# Patient Record
Sex: FEMALE | Race: WHITE | NOT HISPANIC OR LATINO | Employment: OTHER | URBAN - METROPOLITAN AREA
[De-identification: names, ages, dates, MRNs, and addresses within clinical notes are randomized per-mention and may not be internally consistent; named-entity substitution may affect disease eponyms.]

---

## 2016-11-28 LAB
ALT SERPL-CCNC: 35 U/L (ref 6–29)
AST SERPL-CCNC: 22 U/L (ref 10–35)
CREAT SERPL-MCNC: 0.72 MG/DL (ref 0.6–0.93)
GFR SERPL CREATININE-BSD FRML MDRD: 84 ML/MIN/1.73M2
GLUCOSE SERPL-MCNC: 125 MG/DL (ref 65–99)
POTASSIUM SERPL-SCNC: 5 MMOL/L (ref 3.5–5.3)

## 2017-03-30 ENCOUNTER — TRANSFERRED RECORDS (OUTPATIENT)
Dept: HEALTH INFORMATION MANAGEMENT | Facility: CLINIC | Age: 72
End: 2017-03-30

## 2017-03-30 LAB — TSH SERPL-ACNC: 1.82 MIU/L (ref 0.4–4.5)

## 2017-10-12 ENCOUNTER — TRANSFERRED RECORDS (OUTPATIENT)
Dept: HEALTH INFORMATION MANAGEMENT | Facility: CLINIC | Age: 72
End: 2017-10-12

## 2018-10-16 ENCOUNTER — OFFICE VISIT (OUTPATIENT)
Dept: OTOLARYNGOLOGY | Facility: CLINIC | Age: 73
End: 2018-10-16
Payer: MEDICARE

## 2018-10-16 ENCOUNTER — OFFICE VISIT (OUTPATIENT)
Dept: AUDIOLOGY | Facility: CLINIC | Age: 73
End: 2018-10-16
Payer: MEDICARE

## 2018-10-16 VITALS
SYSTOLIC BLOOD PRESSURE: 165 MMHG | DIASTOLIC BLOOD PRESSURE: 89 MMHG | WEIGHT: 164 LBS | BODY MASS INDEX: 28 KG/M2 | HEIGHT: 64 IN | HEART RATE: 75 BPM

## 2018-10-16 DIAGNOSIS — R42 VERTIGO: Primary | ICD-10-CM

## 2018-10-16 DIAGNOSIS — H90.3 SENSORINEURAL HEARING LOSS (SNHL) OF BOTH EARS: Primary | ICD-10-CM

## 2018-10-16 PROCEDURE — 92557 COMPREHENSIVE HEARING TEST: CPT | Performed by: AUDIOLOGIST

## 2018-10-16 PROCEDURE — 99203 OFFICE O/P NEW LOW 30 MIN: CPT | Performed by: OTOLARYNGOLOGY

## 2018-10-16 PROCEDURE — 92550 TYMPANOMETRY & REFLEX THRESH: CPT | Performed by: AUDIOLOGIST

## 2018-10-16 RX ORDER — ASPIRIN 81 MG/1
TABLET ORAL
COMMUNITY
Start: 2006-11-17

## 2018-10-16 RX ORDER — BUSPIRONE HYDROCHLORIDE 5 MG/1
5 TABLET ORAL
COMMUNITY
Start: 2018-07-09

## 2018-10-16 RX ORDER — FEXOFENADINE HCL AND PSEUDOEPHEDRINE HCL 180; 240 MG/1; MG/1
1 TABLET, EXTENDED RELEASE ORAL
COMMUNITY
Start: 2015-06-16

## 2018-10-16 RX ORDER — HYDROXYCHLOROQUINE SULFATE 200 MG/1
200 TABLET, FILM COATED ORAL
COMMUNITY
Start: 2018-09-05

## 2018-10-16 RX ORDER — LANCETS
EACH MISCELLANEOUS
COMMUNITY
Start: 2018-06-05

## 2018-10-16 RX ORDER — LISINOPRIL 40 MG/1
40 TABLET ORAL
COMMUNITY
Start: 2018-07-09

## 2018-10-16 RX ORDER — METFORMIN HCL 500 MG
2000 TABLET, EXTENDED RELEASE 24 HR ORAL
COMMUNITY
Start: 2018-07-09

## 2018-10-16 RX ORDER — ESCITALOPRAM OXALATE 20 MG/1
20 TABLET ORAL
COMMUNITY
Start: 2018-07-09

## 2018-10-16 RX ORDER — SIMVASTATIN 10 MG
10 TABLET ORAL AT BEDTIME
COMMUNITY

## 2018-10-16 RX ORDER — ALBUTEROL SULFATE 90 UG/1
2 AEROSOL, METERED RESPIRATORY (INHALATION)
COMMUNITY
Start: 2018-02-23

## 2018-10-16 RX ORDER — LEUCOVORIN CALCIUM 5 MG/1
TABLET ORAL
COMMUNITY
Start: 2018-09-05

## 2018-10-16 ASSESSMENT — ENCOUNTER SYMPTOMS
HEARTBURN: 0
DOUBLE VISION: 1
BLURRED VISION: 0
TINGLING: 0
BRUISES/BLEEDS EASILY: 0
DIZZINESS: 1
SORE THROAT: 0
PHOTOPHOBIA: 0
HEADACHES: 1
CONSTITUTIONAL NEGATIVE: 1
POLYDIPSIA: 0
SINUS PAIN: 0
STRIDOR: 0
NAUSEA: 0
VOMITING: 0
TREMORS: 1

## 2018-10-16 NOTE — PROGRESS NOTES
AUDIOLOGY REPORT    SUMMARY: Audiology visit completed. See audiogram for results.    RECOMMENDATIONS: Follow-up with ENT.    Cyrus Kim  Doctor of Audiology  [place] License # [id number]

## 2018-10-16 NOTE — MR AVS SNAPSHOT
After Visit Summary   10/16/2018    Cammy Stark    MRN: [id number]           Patient Information     Date Of Birth          1945        Visit Information        Provider Department      10/16/2018 9:30 AM Nathaniel Espinoza MD [facility]        Today's Diagnoses     Vertigo    -  1       Follow-ups after your visit        Additional Services     AUDIOLOGY ADULT REFERRAL       Your provider has referred you to: [facility]: [facility] [phone]   [email or web address]    Treatment:  Evaluation & Treatment  Specialty Testing:  Audiogram w/Tymps and Reflexes    Please be aware that coverage of these services is subject to the terms and limitations of your health insurance plan.  Call member services at your health plan with any benefit or coverage questions.      Please bring the following to your appointment:  >>   Any x-rays, CTs or MRIs which have been performed.  Contact the facility where they were done to arrange for  prior to your scheduled appointment.   >>   List of current medications  >>   This referral request   >>   Any documents/labs given to you for this referral            PHYSICAL THERAPY REFERRAL       If you have not heard from the scheduling office within 2 business days, please call [phone] for all locations, with the exception of [place], please call [phone] and [place], please call [phone].    Please be aware that coverage of these services is subject to the terms and limitations of your health insurance plan.  Call member services at your health plan with any benefit or coverage questions.                  Future tests that were ordered for you today     Open Future Orders        Priority Expected Expires Ordered    PHYSICAL THERAPY REFERRAL Routine  10/16/2019 10/16/2018            Who to contact     If you have questions or need follow up information  "about today's clinic visit or your schedule please contact [facility] directly at [phone].  Normal or non-critical lab and imaging results will be communicated to you by [employer]hart, letter or phone within 4 business days after the clinic has received the results. If you do not hear from us within 7 days, please contact the clinic through MyChart or phone. If you have a critical or abnormal lab result, we will notify you by phone as soon as possible.  Submit refill requests through sim4tec or call your pharmacy and they will forward the refill request to us. Please allow 3 business days for your refill to be completed.          Additional Information About Your Visit        [employer] Information     sim4tec is an electronic gateway that provides easy, online access to your medical records. With sim4tec, you can request a clinic appointment, read your test results, renew a prescription or communicate with your care team.     To sign up for sim4tec visit the website at [email or web address]   You will be asked to enter the access code listed below, as well as some personal information. Please follow the directions to create your username and password.     Your access code is: [email or web address]  Expires: 2019 10:20 AM     Your access code will  in 90 days. If you need help or a new code, please contact your [facility] or call [phone] for assistance.        Care EveryWhere ID     This is your Care EveryWhere ID. This could be used by other organizations to access your [place] medical records  [id number]        Your Vitals Were     Pulse Height BMI (Body Mass Index)             75 1.626 m (5' 4\") 28.15 kg/m2          Blood Pressure from Last 3 Encounters:   10/16/18 165/89    Weight from Last 3 Encounters:   10/16/18 74.4 kg (164 lb)              We Performed the Following     AUDIOLOGY ADULT REFERRAL        Primary Care Provider Office Phone # Fax #    " Art Isael [phone]       [facility] [address] [place] [address]        Equal Access to Services     CHANELLEKAROLINA SCOTTNEIDA : Hadii gurjit ku hadromano Soomaali, waaxda luqadaha, qaybta kaalmada adeegyada, ravindra ibarrarika tess. So [facility] [phone].    ATENCIÓN: Si habla español, tiene a teixeira disposición servicios gratuitos de asistencia lingüística. Llame al [phone].    We comply with applicable federal civil rights laws and [place] laws. We do not discriminate on the basis of race, color, national origin, age, disability, sex, sexual orientation, or gender identity.            Thank you!     Thank you for choosing [facility]  for your care. Our goal is always to provide you with excellent care. Hearing back from our patients is one way we can continue to improve our services. Please take a few minutes to complete the written survey that you may receive in the mail after your visit with us. Thank you!             Your Updated Medication List - Protect others around you: Learn how to safely use, store and throw away your medicines at www.disposemymeds.org.          This list is accurate as of 10/16/18 10:20 AM.  Always use your most recent med list.                   Brand Name Dispense Instructions for use Diagnosis    ACCU-CHEK ACTIVE STRIPS test strip   Generic drug:  blood glucose monitoring      by In Vitro route daily Use to test blood sugar 1 times daily or as directed.        albuterol 108 (90 Base) MCG/ACT inhaler    PROAIR HFA/PROVENTIL HFA/VENTOLIN HFA     Inhale 2 puffs into the lungs        aspirin 81 MG EC tablet           blood glucose monitoring lancets      Dispense item covered by pt ins. E11.65 NIDDM type II, uncontrolled - Test 2 times/day. Reason: High A1C        busPIRone 5 MG tablet    BUSPAR     Take 5 mg by mouth        dulaglutide 1.5 MG/0.5ML pen    TRULICITY     Inject 1.5 mg Subcutaneous        escitalopram 20  MG tablet    LEXAPRO     Take 20 mg by mouth        fexofenadine-pseudoePHEDrine 180-240 MG per 24 hr tablet    ALLEGRA-D 24     Take 1 tablet by mouth        hydroxychloroquine 200 MG tablet    PLAQUENIL     Take 200 mg by mouth        leucovorin 5 MG tablet    WELLCOVORIN     1 tab 12 hours after taking methotrexate dose once a week        lisinopril 40 MG tablet    PRINIVIL/ZESTRIL     Take 40 mg by mouth        metFORMIN 500 MG 24 hr tablet    GLUCOPHAGE-XR     Take 2,000 mg by mouth        PROPRANOLOL HCL PO      Take 40 mg by mouth 2 times daily        simvastatin 10 MG tablet    ZOCOR     Take 10 mg by mouth At Bedtime

## 2018-10-16 NOTE — PROGRESS NOTES
HPI    This is a 73 year old patient who has been having vertigo/ spinning sensation and off-balance sensation.Her problem started after a fall in 2014. Then started to have more symptoms gradually. Her vertigo is made worse by body position changes. She feels dizzy spells  When she rolls, turns to left or right, looking back or head back. She feels tendency to fall to her left. She did have a sinus infection in her left sinuses. Associated symptoms: aural fullness in the left, bilateral constant tinnitus, n/v. She also has double vision since 2014. She was seen by her ophthalmologist. She did have vestibular rehab. Her MRI from 2016 did show left maxillary sinusitis otherwise WNLs. Her CT from 2017 was WNLs. She has a hx of Migraine, diabetes, tremor, hypertension.     Review of Systems   Constitutional: Negative.    HENT: Positive for hearing loss and tinnitus. Negative for congestion, ear discharge, ear pain, nosebleeds, sinus pain and sore throat.    Eyes: Positive for double vision. Negative for blurred vision and photophobia.   Respiratory: Negative for stridor.    Gastrointestinal: Negative for heartburn, nausea and vomiting.   Skin: Negative.    Neurological: Positive for dizziness, tremors and headaches. Negative for tingling.   Endo/Heme/Allergies: Negative for environmental allergies and polydipsia. Does not bruise/bleed easily.         Physical Exam   Constitutional: She is well-developed, well-nourished, and in no distress.   HENT:   Head: Normocephalic and atraumatic.   Right Ear: Tympanic membrane, external ear and ear canal normal. No drainage, swelling or tenderness. No middle ear effusion. Decreased hearing is noted.   Left Ear: External ear normal. No drainage, swelling or tenderness.  No middle ear effusion. Decreased hearing is noted.   Nose: Nose normal. No mucosal edema, rhinorrhea or septal deviation.   Mouth/Throat: Uvula is midline, oropharynx is clear and moist and mucous membranes are  normal. No oropharyngeal exudate.   Eyes: Pupils are equal, round, and reactive to light.   Neck: Neck supple. No tracheal deviation present. No thyromegaly present.   Lymphadenopathy:     She has no cervical adenopathy.   No Spontaneous nystagmus.  She has a tendency to fall to her left.    A/P  This pleasant patient is having bilateral SNHL, positional vertigo, and bilateral tinnitus. The underlying problems including her diabetes, neuropathy, canalolithiasis, vision issues and orthostatic changes discussed. Her previous MRI did not show any retrocochlear pathology. I will refer her to [facility] to try vestibular rehab, Addie poncegabriel. Her questions were answered.

## 2018-10-16 NOTE — MR AVS SNAPSHOT
After Visit Summary   10/16/2018    Cammy Stark    MRN: [id number]           Patient Information     Date Of Birth          1945        Visit Information        Provider Department      10/16/2018 9:00 AM Bryon Sarmiento [facility]        Today's Diagnoses     Sensorineural hearing loss (SNHL) of both ears    -  1       Follow-ups after your visit        Future tests that were ordered for you today     Open Future Orders        Priority Expected Expires Ordered    PHYSICAL THERAPY REFERRAL Routine  10/16/2019 10/16/2018            Who to contact     If you have questions or need follow up information about today's clinic visit or your schedule please contact [facility] directly at [phone].  Normal or non-critical lab and imaging results will be communicated to you by MyChart, letter or phone within 4 business days after the clinic has received the results. If you do not hear from us within 7 days, please contact the clinic through ProtonMediahart or phone. If you have a critical or abnormal lab result, we will notify you by phone as soon as possible.  Submit refill requests through eSNF or call your pharmacy and they will forward the refill request to us. Please allow 3 business days for your refill to be completed.          Additional Information About Your Visit        MyChart Information     eSNF is an electronic gateway that provides easy, online access to your medical records. With eSNF, you can request a clinic appointment, read your test results, renew a prescription or communicate with your care team.     To sign up for eSNF visit the website at [email or web address]   You will be asked to enter the access code listed below, as well as some personal information. Please follow the directions to create your username and password.     Your access code is: [email or web address]  Expires: 2019 10:20 AM     Your access code will  in 90  days. If you need help or a new code, please contact your [facility] or call [phone] for assistance.        Care EveryWhere ID     This is your Care EveryWhere ID. This could be used by other organizations to access your [place] medical records  [id number]         Blood Pressure from Last 3 Encounters:   10/16/18 165/89    Weight from Last 3 Encounters:   10/16/18 164 lb (74.4 kg)              We Performed the Following     AUDIOGRAM/TYMPANOGRAM - INTERFACE     COMPREHENSIVE HEARING TEST     TYMPANOMETRY AND REFLEX THRESHOLD MEASUREMENTS        Primary Care Provider Office Phone # Fax #    Art Isael [phone]       [facility] [address] [place] [address]        Equal Access to Services     JAMIE DOMINIQUE : Hadii aad ku hadasho Soomaali, waaxda luqadaha, qaybta kaalmada adeegyada, waxay idiin hayaan adeeg kharaclarice lafamilia pulido. So [facility] [phone].    ATENCIÓN: Si habla español, tiene a teixeira disposición servicios gratuitos de asistencia lingüística. [facility] [phone].    We comply with applicable federal civil rights laws and [place] laws. We do not discriminate on the basis of race, color, national origin, age, disability, sex, sexual orientation, or gender identity.            Thank you!     Thank you for choosing [facility]  for your care. Our goal is always to provide you with excellent care. Hearing back from our patients is one way we can continue to improve our services. Please take a few minutes to complete the written survey that you may receive in the mail after your visit with us. Thank you!             Your Updated Medication List - Protect others around you: Learn how to safely use, store and throw away your medicines at www.disposemymeds.org.          This list is accurate as of 10/16/18 10:21 AM.  Always use your most recent med list.                   Brand Name Dispense Instructions for use Diagnosis     ACCU-CHEK ACTIVE STRIPS test strip   Generic drug:  blood glucose monitoring      by In Vitro route daily Use to test blood sugar 1 times daily or as directed.        albuterol 108 (90 Base) MCG/ACT inhaler    PROAIR HFA/PROVENTIL HFA/VENTOLIN HFA     Inhale 2 puffs into the lungs        aspirin 81 MG EC tablet           blood glucose monitoring lancets      Dispense item covered by pt ins. E11.65 NIDDM type II, uncontrolled - Test 2 times/day. Reason: High A1C        busPIRone 5 MG tablet    BUSPAR     Take 5 mg by mouth        dulaglutide 1.5 MG/0.5ML pen    TRULICITY     Inject 1.5 mg Subcutaneous        escitalopram 20 MG tablet    LEXAPRO     Take 20 mg by mouth        fexofenadine-pseudoePHEDrine 180-240 MG per 24 hr tablet    ALLEGRA-D 24     Take 1 tablet by mouth        hydroxychloroquine 200 MG tablet    PLAQUENIL     Take 200 mg by mouth        leucovorin 5 MG tablet    WELLCOVORIN     1 tab 12 hours after taking methotrexate dose once a week        lisinopril 40 MG tablet    PRINIVIL/ZESTRIL     Take 40 mg by mouth        metFORMIN 500 MG 24 hr tablet    GLUCOPHAGE-XR     Take 2,000 mg by mouth        PROPRANOLOL HCL PO      Take 40 mg by mouth 2 times daily        simvastatin 10 MG tablet    ZOCOR     Take 10 mg by mouth At Bedtime

## 2018-10-16 NOTE — LETTER
10/16/2018         RE: Cammy Stark  [address] [place] [address]        Dear Colleague,    Thank you for referring your patient, Cammy Stark, to the [facility]. Please see a copy of my visit note below.    HPI    This is a 73 year old patient who has been having vertigo/ spinning sensation and off-balance sensation.Her problem started after a fall in 2014. Then started to have more symptoms gradually. Her vertigo is made worse by body position changes. She feels dizzy spells  When she rolls, turns to left or right, looking back or head back. She feels tendency to fall to her left. She did have a sinus infection in her left sinuses. Associated symptoms: aural fullness in the left, bilateral constant tinnitus, n/v. She also has double vision since 2014. She was seen by her ophthalmologist. She did have vestibular rehab. Her MRI from 2016 did show left maxillary sinusitis otherwise WNLs. Her CT from 2017 was WNLs. She has a hx of Migraine, diabetes, tremor, hypertension.     Review of Systems   Constitutional: Negative.    HENT: Positive for hearing loss and tinnitus. Negative for congestion, ear discharge, ear pain, nosebleeds, sinus pain and sore throat.    Eyes: Positive for double vision. Negative for blurred vision and photophobia.   Respiratory: Negative for stridor.    Gastrointestinal: Negative for heartburn, nausea and vomiting.   Skin: Negative.    Neurological: Positive for dizziness, tremors and headaches. Negative for tingling.   Endo/Heme/Allergies: Negative for environmental allergies and polydipsia. Does not bruise/bleed easily.         Physical Exam   Constitutional: She is well-developed, well-nourished, and in no distress.   HENT:   Head: Normocephalic and atraumatic.   Right Ear: Tympanic membrane, external ear and ear canal normal. No drainage, swelling or tenderness. No middle ear effusion. Decreased hearing is noted.   Left Ear: External ear normal. No drainage,  swelling or tenderness.  No middle ear effusion. Decreased hearing is noted.   Nose: Nose normal. No mucosal edema, rhinorrhea or septal deviation.   Mouth/Throat: Uvula is midline, oropharynx is clear and moist and mucous membranes are normal. No oropharyngeal exudate.   Eyes: Pupils are equal, round, and reactive to light.   Neck: Neck supple. No tracheal deviation present. No thyromegaly present.   Lymphadenopathy:     She has no cervical adenopathy.   No Spontaneous nystagmus.  She has a tendency to fall to her left.    A/P  This pleasant patient is having bilateral SNHL, positional vertigo, and bilateral tinnitus. The underlying problems including her diabetes, neuropathy, canalolithiasis, vision issues and orthostatic changes discussed. Her previous MRI did not show any retrocochlear pathology. I will refer her to [facility] to try vestibular rehab, Addie lal. Her questions were answered.       Again, thank you for allowing me to participate in the care of your patient.        Sincerely,        Nathaniel Espinoza MD

## 2018-10-29 ENCOUNTER — HOSPITAL ENCOUNTER (OUTPATIENT)
Dept: PHYSICAL THERAPY | Facility: CLINIC | Age: 73
Setting detail: THERAPIES SERIES
End: 2018-10-29
Attending: OTOLARYNGOLOGY
Payer: MEDICARE

## 2018-10-29 PROCEDURE — 97162 PT EVAL MOD COMPLEX 30 MIN: CPT | Mod: GP | Performed by: PHYSICAL THERAPIST

## 2018-10-29 PROCEDURE — G8979 MOBILITY GOAL STATUS: HCPCS | Mod: GP,CJ | Performed by: PHYSICAL THERAPIST

## 2018-10-29 PROCEDURE — G8978 MOBILITY CURRENT STATUS: HCPCS | Mod: GP,CK | Performed by: PHYSICAL THERAPIST

## 2018-10-29 PROCEDURE — 40000840 ZZHC STATISTIC PT VESTIBULAR VISIT: Performed by: PHYSICAL THERAPIST

## 2018-10-29 NOTE — PROGRESS NOTES
[facility]      Outpatient Physical Therapy Evaluation  PLAN OF TREATMENT FOR OUTPATIENT REHABILITATION  (COMPLETE FOR INITIAL CLAIMS ONLY)  Patient's Last Name, First Name, M.I.  YOB: 1945  Cammy Stark                        Provider's Name  [facility] Medical Record No.  [id number]                               Onset Date:  Order date 10/16/2018   Start of Care Date: 10/29/2018     Type: Physical Therapy Medical Diagnosis: Dizziness/vertrigo                        Therapy Diagnosis:  Dizziness/vertigo, Falls risk, double vision, space/motion sensitivity   Visits from SOC:  1   _________________________________________________________________________________  Plan of Treatment:      Frequency/Duration: see every 1-2 weeks for 3 months    Goals: improve her subjective rating of symptoms on DHI and improve her tolerance for community mobility like grocery shopping  _________________________________________________________________________________    I CERTIFY THE NEED FOR THESE SERVICES FURNISHED UNDER        THIS PLAN OF TREATMENT AND WHILE UNDER MY CARE     (Physician co-signature of this document indicates review and certification of the therapy plan).                Certification date from: 10/29/2018  Certification date to: 1/26/2019    Referring Provider: Dr Catalino Espinoza

## 2018-10-29 NOTE — PROGRESS NOTES
10/29/18 1200   General Information   Start of Care Date 10/29/18   Referring Physician DR Sergey Espinoza   Orders Evaluate and Treat as Indicated   Order Date 10/16/18   Medical Diagnosis vertigo  (started in 2015 or 2014)   Special Instructions had facial pain on left side for 2.5 weeks in 9/2018-DR Bae told her she was grinding her teeth, took advil and tylenol. It was primarily when on a bus trip trip   Surgical/Medical history reviewed Yes  (double vision, migraines, DM, tremors, HTN)   Pertinent history of current problem (include personal factors and/or comorbidities that impact the POC) In 2015/2014 it started suddenly. I had fallen earlier in the year landed flat on her back on ice but was fine for months. Ok for a couple months then symptoms started. .Balance comes and goes all day long. If I walk and look to the left it bothers me especially. I can't walk and look side to side, it leads me to the left more than anything. I haven't been falling now but I did at first. I dont do much I get queasy. No vomiting. Quaesy It comes and goes. My head doesnt go round and round but the walls move, it lasts for seconds, When I look straight ahead I am Ok but turning makes it happen. Head pulls to the left a lot and sometimes pulls down. No dizziness in bed or sitting in  a chair when holds her head still. When I am sitting I see double. Its when I am moving my head.    Pertinent Visual History  glasses with prisms. cataracts surgery both eyes 8/2016, [facility] started Oct 2016. Did not change much for her. Has prisms in her glasses. She can read on oralia, can read newspaper   Prior level of function comment cannot drive because of this. Doesn't go grocery shopping, looking around makes her worse.    Diagnostic Tests MRI;VNG;Rotational Chair;CDP;Audiogram   MRI Results unremarkable  (3/2016)   VNG Results   VNG results only a 5% right weakness but 13% directinal preponderance to  the left. Positionals have some weak left beating nystagmus.    Rotational chair Results   Rotational chair results some abnormal   CDP Results   CDP results below normal for her age but improve n therapy   Audiogram Results Results   Audiogram Results B SNHL but slightly asymmetric tested in 2016??, B tinnitus   Previous/Current Treatment Physical Therapy;Other   Improvement after PT Mild  ([facility] for 9 weeks spring of 2016, 3 days of testing also)   Current Community Support Family/friend caregiver  ([facility])   General Information Comments I tried to look at some of the [facility] notes during her appt but there were a lot of notes. The Pt notes formspring 2016 do look like she improved on the DGI and CDP. DVA was off and convergence abnormal so they sent her vision therapy. Pt notes have some mild left beating nystagmus in right DH and when head was to the right.    Fall Risk Screen   Fall screen completed by PT   Have you fallen 2 or more times in the past year? No   Have you fallen and had an injury in the past year? No   Is patient a fall risk? Yes   Fall screen comments last bad fall was august of 2017, tripped over my vacuum and hit back of head, 3 stitches needed. I seem to catch myself now.   Functional Scales   Functional Scales and Outcomes DHI 74/100   Pain   Patient currently in pain No   Pain comments I get headaches every couple of weeks, I always thought they were sinus headaches.  I take advil, not horrible headaches. HEadaches do not make the dizziness or balance worse   Vitals Signs   Heart Rate 67   Blood Pressure 158/80  (left arm)   Range of Motion (ROM)   ROM Comment CROM WFLS   Gait Special Tests 25 Foot Timed Walk   Seconds 9.03   Steps 15 Steps   Comments normal HANNAH, symmetrical steps, upright posture, arms swing a bit bilaterally   Gait Special Tests Functional Gait Assessment Score out of 30   Score out of 30 20   Sensory Examination   Sensory Perception other (describe)   Sensory Perception  "Comments not neuropathy per pts (per MD to her) but has some numbness/tingling in both feet at toes. Been this way for a few years.    Muscle Tone   Muscle Tone Comments tremors, takes propanolol, the tremors got really bad when this all started.   Infrared Goggle Exam or Frenzel Lense Exam   Vestibular Suppressant in Last 24 Hours? No   Exam completed with Infrared Goggles   Spontaneous Nystagmus Negative   Gaze Evoked Nystagmus Negative   Head Shake Horizontal Nystagmus Horizontal L;Other   Head Shake Horizontal Nystagmus comments very small amplitude left beats and only about 5 or 6. Feels 'woozy\"   Clinical Impression   Criteria for Skilled Therapeutic Interventions Met yes, treatment indicated   PT Diagnosis fall risk, dizziness/vertigo and tremor   Influenced by the following impairments double vision, tremor, dizziness/vertigo, dynamic postural control, ?sensation in feet, and visually provoked symptoms   Functional limitations due to impairments affects ADLs/IADLS, household/community mobility, and recreational activity. Affects driving.   Clinical Presentation Evolving/Changing   Clinical Presentation Rationale complex/chronic medical history (lots of issues), impairment list, DHI, FGA, and clinical judgement    Clinical Decision Making (Complexity) Moderate complexity   Therapy Frequency other (see comments)   Predicted Duration of Therapy Intervention (days/wks) 3 months   Risk & Benefits of therapy have been explained Yes   Patient, Family & other staff in agreement with plan of care Yes   Clinical Impression Comments Complex case. Symptoms started abruptly in 2014, including worsening of her tremor. The double vision complaint resulted in prisms but she has not seen a neuro-ophthamalogist. This is not BPPV. it sounds like it might be a progressive problem that may need further work up. I will try some VR again.   Goal 1   Goal Identifier DHI   Goal Description Improve from 74/100 to 56/100 or less on " DHI (subjective rating of symptoms)   Target Date 01/29/19   Goal 2   Goal Identifier grocery store   Goal Description she will report that she can walk in a store with a grocery cart and move her head toshop for 15 minute periods of time with minimal symptoms (reduce her visually provoked symptoms)   Target Date 01/29/19   Total Evaluation Time   Total Evaluation Time (Minutes) 50   Norma Sheehan DPT, MPT, NCS

## 2018-10-31 DIAGNOSIS — R42 VERTIGO: Primary | ICD-10-CM

## 2018-10-31 DIAGNOSIS — H53.2 DOUBLE VISION: ICD-10-CM

## 2018-11-09 NOTE — TELEPHONE ENCOUNTER
FUTURE VISIT INFORMATION      FUTURE VISIT INFORMATION:    Date: 11/14/18    Time: 830am    Location: [facility]  REFERRAL INFORMATION:    Referring provider:  SHANA SALEH    Referring providers clinic:  ENT [facility]     Reason for visit/diagnosis  Vertigo with Double vision.    RECORDS REQUESTED FROM:       Clinic name Comments Records Status Imaging Status   ENT [facility]  Notes in EPIC per visit on 10/16/18 In [facility] MRI Brain 2016. Notes in EPIC/Images in PACS In [facility] In PACS   [facility]  Notes transferred to HIM IN [facility]    National Dizzy and National Notes transferred to [facility] In [facility]

## 2018-11-14 ENCOUNTER — PRE VISIT (OUTPATIENT)
Dept: OPHTHALMOLOGY | Facility: CLINIC | Age: 73
End: 2018-11-14

## 2018-11-14 ENCOUNTER — OFFICE VISIT (OUTPATIENT)
Dept: OPHTHALMOLOGY | Facility: CLINIC | Age: 73
End: 2018-11-14
Payer: MEDICARE

## 2018-11-14 DIAGNOSIS — H53.10 SUBJECTIVE VISUAL DISTURBANCE: Primary | ICD-10-CM

## 2018-11-14 DIAGNOSIS — H51.8 SKEW DEVIATION: Primary | ICD-10-CM

## 2018-11-14 DIAGNOSIS — R42 VERTIGO: ICD-10-CM

## 2018-11-14 DIAGNOSIS — H53.10 SUBJECTIVE VISUAL DISTURBANCE: ICD-10-CM

## 2018-11-14 ASSESSMENT — TONOMETRY
OD_IOP_MMHG: 10
OS_IOP_MMHG: 13
IOP_METHOD: ICARE

## 2018-11-14 ASSESSMENT — REFRACTION_WEARINGRX
OD_SPHERE: -1.00
OS_SPHERE: -0.25
OS_CYLINDER: SPHERE
OD_CYLINDER: +1.25
OS_ADD: +2.50
OS_HPRISM: 1 BO
OD_AXIS: 135
OD_ADD: +2.50

## 2018-11-14 ASSESSMENT — CUP TO DISC RATIO
OD_RATIO: 0.3
OS_RATIO: 0.3

## 2018-11-14 ASSESSMENT — VISUAL ACUITY
OS_CC: 20/20
OD_CC: 20/30
OD_CC+: +2
METHOD: SNELLEN - LINEAR

## 2018-11-14 ASSESSMENT — PATIENT HEALTH QUESTIONNAIRE - PHQ9
SUM OF ALL RESPONSES TO PHQ QUESTIONS 1-9: 11
SUM OF ALL RESPONSES TO PHQ QUESTIONS 1-9: 11
10. IF YOU CHECKED OFF ANY PROBLEMS, HOW DIFFICULT HAVE THESE PROBLEMS MADE IT FOR YOU TO DO YOUR WORK, TAKE CARE OF THINGS AT HOME, OR GET ALONG WITH OTHER PEOPLE: VERY DIFFICULT

## 2018-11-14 ASSESSMENT — SLIT LAMP EXAM - LIDS
COMMENTS: NORMAL
COMMENTS: NORMAL

## 2018-11-14 ASSESSMENT — EXTERNAL EXAM - RIGHT EYE: OD_EXAM: NORMAL

## 2018-11-14 ASSESSMENT — EXTERNAL EXAM - LEFT EYE: OS_EXAM: NORMAL

## 2018-11-14 NOTE — PROGRESS NOTES
"       Assessment & Plan     Cammy Stark is a 73 year old female with the following diagnoses:   1. Skew deviation    2. Subjective visual disturbance       Cammy was referred by Dr. Espinoza (ENT) for vertigo and some eye concerns. Per last documentation by Dr. Espinoza \"patient is having bilateral SNHL, positional vertigo, and bilateral tinnitus. The underlying problems including her diabetes, neuropathy, canalolithiasis, vision issues and orthostatic changes discussed. Her previous MRI did not show any retrocochlear pathology. I will refer her to Brii to try vestibular rehab, [place] hallpike\".     She started noticing off balance, dizzy and feels like the room is \"swaying\" like she is on a boat since 2015. This occurs after prolonged eccentric gaze (longer than 5 seconds) to either side. She feels this, along with diagonal double vision. she feels that she wants to tip that direction. + ocillopsia.     [facility] Status post 9 sessions in 2016. They told her that it was the eyes that caused her symptoms. So went to eye doctor and eventually to vision therapy x 6 months. She feels like she got slightly better - didn't see double as much, but it got too expensive.     She had cataract surgery 08/2016 but didn't help. She saw Dr. Ho 8/2016 who told her that she didn't have double vision and there was nothing he could do. She tried getting into [facility] but they \"rejected her\".     Her internist have told her taht she had to live with it. She saw a different doctor who gave her a bunch of supplements which didn't help.     She got prism placed in her glasses 2017.     MRI head with and without contrast 2/2016: minimal supratentorial white matter change that is nonspecific, but in her age, likely due to chronic small vessel ischemic disease.     She is eating a paleo diet since 5/2018. No etoh. Non smoker.     On examination, her visual acuity 20/30+2 right eye " 20/20 left eye. Color plates full both eyes. She has an esophoria of 2 PD in primary gaze that manifests lateral gazes. She does have jerk nystagmus gaze evoked. Anterior segment examination with PCIOL and mild PCO. Funduscopic examination unremarkable.    In summary, her gaze evoked nystagmus is likely caused by her inner ear issue.  She has seen one of our ENT docs and our balance center without benefit.  Closing her eyes does not improve her dizziness.  Will refer to Dr. Andrade at [facility] who is a evy-neurologist for possible evaluation and treatment.            Attending Physician Attestation:  Complete documentation of historical and exam elements from today's encounter can be found in the full encounter summary report (not reduplicated in this progress note).  I personally obtained the chief complaint(s) and history of present illness.  I confirmed and edited as necessary the review of systems, past medical/surgical history, family history, social history, and examination findings as documented by others; and I examined the patient myself.  I personally reviewed the relevant tests, images, and reports as documented above.  I formulated and edited as necessary the assessment and plan and discussed the findings and management plan with the patient and family. - Jatin Rivera MD

## 2018-11-14 NOTE — LETTER
"2018         RE:  :  MRN: Cammy Stark  1945  [id number]     Dear Dr. Espinoza,    Thank you for asking me to see your very pleasant patient, Cammy Stark, in neuro-ophthalmic consultation.  I would like to thank you for sending your records and I have summarized them in the history of present illness.   My assessment and plan are below.  For further details, please see my attached clinic note.          Assessment & Plan     Cammy Stark is a 73 year old female with the following diagnoses:   1. Skew deviation    2. Subjective visual disturbance       Cammy was referred by Dr. Espinoza (ENT) for vertigo and some eye concerns. Per last documentation by Dr. Espinoza \"patient is having bilateral SNHL, positional vertigo, and bilateral tinnitus. The underlying problems including her diabetes, neuropathy, canalolithiasis, vision issues and orthostatic changes discussed. Her previous MRI did not show any retrocochlear pathology. I will refer her to Brii to try vestibular rehab, [place] l[facility]asz\".     She started noticing off balance, dizzy and feels like the room is \"swaying\" like she is on a boat since . This occurs after prolonged eccentric gaze (longer than 5 seconds) to either side. She feels this, along with diagonal double vision. she feels that she wants to tip that direction. + ocillopsia.     [facility] Status [facility] sessions in . They told her that it was the eyes that caused her symptoms. So went to eye doctor and eventually to vision therapy x 6 months. She feels like she got slightly better - didn't see double as much, but it got too expensive.     She had cataract surgery 2016 but didn't help. She saw Dr. Ho 2016 who told her that she didn't have double vision and there was nothing he could do. She tried getting into [facility] but they \"rejected her\".     Her internist have told her taht she had to live with it. She saw a " different doctor who gave her a bunch of supplements which didn't help.     She got prism placed in her glasses 2017.     MRI head with and without contrast 2/2016: minimal supratentorial white matter change that is nonspecific, but in her age, likely due to chronic small vessel ischemic disease.     She is eating a paleo diet since 5/2018. No etoh. Non smoker.     On examination, her visual acuity 20/30+2 right eye 20/20 left eye. Color plates full both eyes. She has an esophoria of 2 PD in primary gaze that manifests lateral gazes. She does have jerk nystagmus gaze evoked. Anterior segment examination with PCIOL and mild PCO. Funduscopic examination unremarkable.    In summary, her gaze evoked nystagmus is likely caused by her inner ear issue.  She has seen one of our ENT docs and our balance center without benefit.  Closing her eyes does not improve her dizziness.  Will refer to Dr. Andrade at [facility] who is a evy-neurologist for possible evaluation and treatment.       Again, thank you for allowing me to participate in the care of your patient.      Sincerely,    Jatin Rivera MD  Professor, Neuro-Ophthalmology  Department of Ophthalmology and Visual Neurosciences  [facility]    CC: Nathaniel Espinoza MD  [address]  [place] [address]  VIA In [facility]  [address] [place] [address]  VIA Facsimile: [phone]

## 2018-11-14 NOTE — NURSING NOTE
Chief Complaints and History of Present Illnesses   Patient presents with     Neurologic Problem     vertigo and diplopia     HPI    Symptoms:              Comments:  Cammy Stark is a 73 year old female who presents today for  Vertigo  Diplopia  Onset February 2016. Improved since onset.   Few occassions of diplopia in 2015 when driving at night.   Intermittent binocular horizontal diplopia. Worse in left gaze.    Jammie HERNANDES 8:23 AM November 14, 2018

## 2018-11-14 NOTE — MR AVS SNAPSHOT
After Visit Summary   11/14/2018    Cammy Stark    MRN: [id number]           Patient Information     Date Of Birth          1945        Visit Information        Provider Department      11/14/2018 8:30 AM Jatin Rivera MD [facility]        Today's Diagnoses     Skew deviation    -  1    Subjective visual disturbance        Vertigo           Follow-ups after your visit        Additional Services     NEUROLOGY ADULT REFERRAL       Your provider has referred you for the following:   Oscar Andrade MD - [facility]     Please be aware that coverage of these services is subject to the terms and limitations of your health insurance plan.  Call member services at your health plan with any benefit or coverage questions.      Please bring the following with you to your appointment:    (1) Any X-Rays, CTs or MRIs which have been performed.  Contact the facility where they were done to arrange for  prior to your scheduled appointment.    (2) List of current medications  (3) This referral request   (4) Any documents/labs given to you for this referral                  Your next 10 appointments already scheduled     Nov 15, 2018 10:00 AM CST   Vestibular Treatment with Brii Sheehan, PT   [facility] ([facility])    [address] [facility] [address] [phone]              Future tests that were ordered for you today     Open Future Orders        Priority Expected Expires Ordered    NEUROLOGY ADULT REFERRAL Routine  11/14/2019 11/14/2018            Who to contact     Please call your clinic at [phone] to:    Ask questions about your health    Make or cancel appointments    Discuss your medicines    Learn about your test results    Speak to your doctor            Additional Information About Your Visit        MyChart Information     [employer] gives you secure access to your electronic health record. If you see a primary care  provider, you can also send messages to your care team and make appointments. If you have questions, please call your primary care clinic.  If you do not have a primary care provider, please call [phone] and they will assist you.      [employer] is an electronic gateway that provides easy, online access to your medical records. With [employer], you can request a clinic appointment, read your test results, renew a prescription or communicate with your care team.     To access your existing account, please contact your [facility] or call [phone] for assistance.        Care EveryWhere ID     This is your Care EveryWhere ID. This could be used by other organizations to access your [facility] medical records  [id number]         Blood Pressure from Last 3 Encounters:   10/16/18 165/89    Weight from Last 3 Encounters:   10/16/18 74.4 kg (164 lb)              We Performed the Following     DILATED FUNDUS EXAM     IOP Measurement     Sensorimotor        Primary Care Provider Office Phone # Fax #    Art Isael [phone]       [facility] [address] [place] [address]        Equal Access to Services     [facility] Center[facility] : Hadii aad ku hadasho Soomaali, waaxda luqadaha, qaybta kaalmada adelindseyyaacri, ravindra pluido. So [facility] [phone].    ATENCIÓN: Si habla español, tiene a teixeira disposición servicios gratuitos de asistencia lingüística. Irena al [phone].    We comply with applicable federal civil rights laws and [place] laws. We do not discriminate on the basis of race, color, national origin, age, disability, sex, sexual orientation, or gender identity.            Thank you!     Thank you for choosing [facility]  for your care. Our goal is always to provide you with excellent care. Hearing back from our patients is one way we can continue to improve our services. Please take a few minutes to complete the  written survey that you may receive in the mail after your visit with us. Thank you!             Your Updated Medication List - Protect others around you: Learn how to safely use, store and throw away your medicines at [email or web address].          This list is accurate as of 11/14/18  9:55 AM.  Always use your most recent med list.                   Brand Name Dispense Instructions for use Diagnosis    ACCU-CHEK ACTIVE STRIPS test strip   Generic drug:  blood glucose monitoring      by In Vitro route daily Use to test blood sugar 1 times daily or as directed.        albuterol 108 (90 Base) MCG/ACT inhaler    PROAIR HFA/PROVENTIL HFA/VENTOLIN HFA     Inhale 2 puffs into the lungs        aspirin 81 MG EC tablet           blood glucose monitoring lancets      Dispense item covered by pt ins. E11.65 NIDDM type II, uncontrolled - Test 2 times/day. Reason: High A1C        busPIRone 5 MG tablet    BUSPAR     Take 5 mg by mouth        * dulaglutide 1.5 MG/0.5ML pen    TRULICITY     Inject 1.5 mg Subcutaneous        * TRULICITY 1.5 MG/0.5ML pen   Generic drug:  dulaglutide           escitalopram 20 MG tablet    LEXAPRO     Take 20 mg by mouth        fexofenadine-pseudoePHEDrine 180-240 MG per 24 hr tablet    ALLEGRA-D 24     Take 1 tablet by mouth        hydroxychloroquine 200 MG tablet    PLAQUENIL     Take 200 mg by mouth        leucovorin 5 MG tablet    WELLCOVORIN     1 tab 12 hours after taking methotrexate dose once a week        lisinopril 40 MG tablet    PRINIVIL/ZESTRIL     Take 40 mg by mouth        metFORMIN 500 MG 24 hr tablet    GLUCOPHAGE-XR     Take 2,000 mg by mouth        PROPRANOLOL HCL PO      Take 40 mg by mouth 2 times daily        simvastatin 10 MG tablet    ZOCOR     Take 10 mg by mouth At Bedtime        * Notice:  This list has 2 medication(s) that are the same as other medications prescribed for you. Read the directions carefully, and ask your doctor or other care provider to review them with you.

## 2018-11-15 ASSESSMENT — PATIENT HEALTH QUESTIONNAIRE - PHQ9: SUM OF ALL RESPONSES TO PHQ QUESTIONS 1-9: 11

## 2018-11-27 ENCOUNTER — TELEPHONE (OUTPATIENT)
Dept: OPHTHALMOLOGY | Facility: CLINIC | Age: 73
End: 2018-11-27

## 2018-11-27 NOTE — TELEPHONE ENCOUNTER
Note to neuro-ophthalmology fellow to review other options for referral-- message states [facility] denying appt per dr. Rivera's referral to [facility]  Rickey Deleon RN 2:17 PM 11/27/18

## 2018-11-27 NOTE — TELEPHONE ENCOUNTER
[facility] Call Center    Phone Message    May a detailed message be left on voicemail: yes    Reason for Call: Other: Patient called [facility]. Patient was just turned down. Patient is wondering if there is anything Dr. Rivera can do to get her in.      Action Taken: Message routed to:  [facility] Eye

## 2019-03-27 ENCOUNTER — TELEPHONE (OUTPATIENT)
Dept: AUDIOLOGY | Facility: CLINIC | Age: 74
End: 2019-03-27

## 2019-03-27 NOTE — TELEPHONE ENCOUNTER
[facility] Call Center    Phone Message    May a detailed message be left on voicemail: no    Reason for Call: Order(s): Other:   Reason for requested: Pt would like to order hearing aids. Saw Dr Sarmiento Oct 2018.   Date needed: Soon  Provider name: Bryon Sarmiento      Action Taken: Message routed to:  Adult Clinics: ENT p [id number]

## 2019-03-28 NOTE — TELEPHONE ENCOUNTER
Patient is following up on this message that she left yesterday.  She states she is on a time crunch and that she has to order her hearing aids before 4/18.  She is requesting a call back at her home number as soon as possible.  Please advise.

## 2020-03-11 ENCOUNTER — HEALTH MAINTENANCE LETTER (OUTPATIENT)
Age: 75
End: 2020-03-11

## 2021-01-03 ENCOUNTER — HEALTH MAINTENANCE LETTER (OUTPATIENT)
Age: 76
End: 2021-01-03

## 2021-04-25 ENCOUNTER — HEALTH MAINTENANCE LETTER (OUTPATIENT)
Age: 76
End: 2021-04-25

## 2021-10-10 ENCOUNTER — HEALTH MAINTENANCE LETTER (OUTPATIENT)
Age: 76
End: 2021-10-10

## 2022-05-21 ENCOUNTER — HEALTH MAINTENANCE LETTER (OUTPATIENT)
Age: 77
End: 2022-05-21

## 2022-09-18 ENCOUNTER — HEALTH MAINTENANCE LETTER (OUTPATIENT)
Age: 77
End: 2022-09-18

## 2023-05-17 NOTE — NURSING NOTE
"Cammy Stark's goals for this visit include:   Chief Complaint   Patient presents with     Consult     Vertigo       She requests these members of her care team be copied on today's visit information: yes      PCP: Art Kirkland    Referring Provider:  No referring provider defined for this encounter.    /89  Pulse 75  Ht 1.626 m (5' 4\")  Wt 74.4 kg (164 lb)  BMI 28.15 kg/m2    Chanell Choi CMA ([facility])    "
General

## 2023-07-30 ENCOUNTER — HEALTH MAINTENANCE LETTER (OUTPATIENT)
Age: 78
End: 2023-07-30